# Patient Record
Sex: MALE | Race: OTHER | Employment: UNEMPLOYED | URBAN - METROPOLITAN AREA
[De-identification: names, ages, dates, MRNs, and addresses within clinical notes are randomized per-mention and may not be internally consistent; named-entity substitution may affect disease eponyms.]

---

## 2018-06-06 ENCOUNTER — OFFICE VISIT (OUTPATIENT)
Dept: FAMILY MEDICINE CLINIC | Age: 28
End: 2018-06-06
Payer: MEDICARE

## 2018-06-06 VITALS
WEIGHT: 207 LBS | DIASTOLIC BLOOD PRESSURE: 78 MMHG | HEART RATE: 76 BPM | TEMPERATURE: 98.7 F | BODY MASS INDEX: 28.04 KG/M2 | HEIGHT: 72 IN | SYSTOLIC BLOOD PRESSURE: 120 MMHG

## 2018-06-06 DIAGNOSIS — Z13.220 SCREENING FOR CHOLESTEROL LEVEL: ICD-10-CM

## 2018-06-06 DIAGNOSIS — Z13.228 SCREENING FOR METABOLIC DISORDER: ICD-10-CM

## 2018-06-06 DIAGNOSIS — Z72.89 OTHER PROBLEMS RELATED TO LIFESTYLE: ICD-10-CM

## 2018-06-06 DIAGNOSIS — R42 VERTIGO: Primary | ICD-10-CM

## 2018-06-06 LAB — HIV AG/AB: NEGATIVE

## 2018-06-06 PROCEDURE — G8419 CALC BMI OUT NRM PARAM NOF/U: HCPCS | Performed by: NURSE PRACTITIONER

## 2018-06-06 PROCEDURE — G8427 DOCREV CUR MEDS BY ELIG CLIN: HCPCS | Performed by: NURSE PRACTITIONER

## 2018-06-06 PROCEDURE — 36415 COLL VENOUS BLD VENIPUNCTURE: CPT | Performed by: NURSE PRACTITIONER

## 2018-06-06 PROCEDURE — 99202 OFFICE O/P NEW SF 15 MIN: CPT | Performed by: NURSE PRACTITIONER

## 2018-06-06 PROCEDURE — 1036F TOBACCO NON-USER: CPT | Performed by: NURSE PRACTITIONER

## 2018-06-06 RX ORDER — FLUTICASONE PROPIONATE 50 MCG
1 SPRAY, SUSPENSION (ML) NASAL DAILY
Qty: 1 BOTTLE | Refills: 0 | Status: SHIPPED | OUTPATIENT
Start: 2018-06-06

## 2018-06-06 RX ORDER — MECLIZINE HYDROCHLORIDE 25 MG/1
25 TABLET ORAL 3 TIMES DAILY PRN
Qty: 30 TABLET | Refills: 0 | Status: SHIPPED | OUTPATIENT
Start: 2018-06-06 | End: 2018-06-16

## 2018-06-06 ASSESSMENT — ENCOUNTER SYMPTOMS
NAUSEA: 1
SORE THROAT: 0
VOMITING: 0

## 2018-06-06 ASSESSMENT — PATIENT HEALTH QUESTIONNAIRE - PHQ9
1. LITTLE INTEREST OR PLEASURE IN DOING THINGS: 0
SUM OF ALL RESPONSES TO PHQ QUESTIONS 1-9: 0
SUM OF ALL RESPONSES TO PHQ QUESTIONS 1-9: 0
SUM OF ALL RESPONSES TO PHQ9 QUESTIONS 1 & 2: 0
SUM OF ALL RESPONSES TO PHQ9 QUESTIONS 1 & 2: 0
2. FEELING DOWN, DEPRESSED OR HOPELESS: 0
1. LITTLE INTEREST OR PLEASURE IN DOING THINGS: 0

## 2018-06-07 DIAGNOSIS — Z13.228 SCREENING FOR METABOLIC DISORDER: ICD-10-CM

## 2018-06-07 DIAGNOSIS — Z13.220 SCREENING FOR CHOLESTEROL LEVEL: ICD-10-CM

## 2018-06-07 DIAGNOSIS — R73.01 IMPAIRED FASTING GLUCOSE: Primary | ICD-10-CM

## 2018-06-11 DIAGNOSIS — Z72.89 OTHER PROBLEMS RELATED TO LIFESTYLE: ICD-10-CM

## 2022-05-17 ENCOUNTER — HOSPITAL ENCOUNTER (EMERGENCY)
Age: 32
Discharge: HOME | End: 2022-05-18
Attending: EMERGENCY MEDICINE
Payer: MEDICARE

## 2022-05-17 ENCOUNTER — APPOINTMENT (OUTPATIENT)
Dept: GENERAL RADIOLOGY | Age: 32
End: 2022-05-17
Payer: MEDICARE

## 2022-05-17 VITALS
HEART RATE: 72 BPM | TEMPERATURE: 97.8 F | RESPIRATION RATE: 18 BRPM | BODY MASS INDEX: 31.02 KG/M2 | WEIGHT: 229 LBS | HEIGHT: 72 IN | SYSTOLIC BLOOD PRESSURE: 166 MMHG | OXYGEN SATURATION: 99 % | DIASTOLIC BLOOD PRESSURE: 109 MMHG

## 2022-05-17 DIAGNOSIS — R03.0 ELEVATED BLOOD PRESSURE READING: ICD-10-CM

## 2022-05-17 DIAGNOSIS — S46.911A STRAIN OF RIGHT SHOULDER, INITIAL ENCOUNTER: Primary | ICD-10-CM

## 2022-05-17 PROCEDURE — 96372 THER/PROPH/DIAG INJ SC/IM: CPT

## 2022-05-17 PROCEDURE — 99284 EMERGENCY DEPT VISIT MOD MDM: CPT

## 2022-05-17 PROCEDURE — 73030 X-RAY EXAM OF SHOULDER: CPT

## 2022-05-17 ASSESSMENT — PAIN - FUNCTIONAL ASSESSMENT: PAIN_FUNCTIONAL_ASSESSMENT: 0-10

## 2022-05-17 ASSESSMENT — PAIN SCALES - GENERAL: PAINLEVEL_OUTOF10: 7

## 2022-05-18 PROCEDURE — 6360000002 HC RX W HCPCS: Performed by: PHYSICIAN ASSISTANT

## 2022-05-18 RX ORDER — KETOROLAC TROMETHAMINE 30 MG/ML
60 INJECTION, SOLUTION INTRAMUSCULAR; INTRAVENOUS ONCE
Status: COMPLETED | OUTPATIENT
Start: 2022-05-18 | End: 2022-05-18

## 2022-05-18 RX ORDER — NAPROXEN 500 MG/1
500 TABLET ORAL 2 TIMES DAILY WITH MEALS
Qty: 20 TABLET | Refills: 0 | Status: SHIPPED | OUTPATIENT
Start: 2022-05-18

## 2022-05-18 RX ORDER — METHOCARBAMOL 750 MG/1
750 TABLET, FILM COATED ORAL 3 TIMES DAILY
Qty: 30 TABLET | Refills: 0 | Status: SHIPPED | OUTPATIENT
Start: 2022-05-18 | End: 2022-05-28

## 2022-05-18 RX ADMIN — KETOROLAC TROMETHAMINE 60 MG: 30 INJECTION, SOLUTION INTRAMUSCULAR at 00:20

## 2022-05-18 ASSESSMENT — PAIN SCALES - GENERAL: PAINLEVEL_OUTOF10: 8

## 2022-05-18 NOTE — ED PROVIDER NOTES
[address] [place] [address] ED  [facility]      Pt Name: Tyron Da Silva  MRN: [id number]  [place] 1990  Date ofevaluation: 2022  Provider: Nabor De aL Vega PA-C    CHIEF COMPLAINT       Chief Complaint   Patient presents with    Shoulder Pain     R, x1.5 years, no injury         HISTORY OF PRESENT ILLNESS  (Location/Symptom, Timing/Onset, Context/Setting, Quality, Duration, Modifying Factors, Severity.)   Tyron Da Silva is a 32 y.o. male who presents to the emergency department with let shoulder pain for 1. 5 years. Denies injuries. Pain started 1.5 yearsago when working in  [address] [employer]. Never sought evaluation. Pain intermittent and worse with movement and relieved with rest.   Nursing Notes were reviewed. ALLERGIES     Patient has no known allergies. CURRENT MEDICATIONS       Previous Medications    FLUTICASONE (FLONASE) 50 MCG/ACT NASAL SPRAY    1 spray by Nasal route daily       PAST MEDICAL HISTORY   History reviewed. No pertinent past medical history. SURGICAL HISTORY           Procedure Laterality Date    ADENOIDECTOMY      TYMPANOSTOMY TUBE PLACEMENT           FAMILY HISTORY           Problem Relation Age of Onset    No Known Problems Mother     Diabetes Father      Family Status   Relation Name Status    Mother  Alive    Father          SOCIAL HISTORY      reports that he has never smoked. He has never used smokeless tobacco. He reports current alcohol use. He reports that he does not use drugs. REVIEW OFSYSTEMS    (2-9 systems for level 4, 10 or more for level 5)   Review of Systems    Except as noted above the remainder of the review of systems was reviewed and negative.      PHYSICAL EXAM    (up to 7 for level 4, 8 or more for level 5)     ED Triage Vitals [22 2248]   BP Temp Temp Source Pulse Resp SpO2 Height Weight   (!) 166/109 97.8 °F (36.6 °C) Oral 72 18 99 % 6' (1.829 m) 229 lb (103.9 kg)      Physical Exam  Constitutional:       Appearance: in the emergency department. I recommend that the patient call the primary care provider listed on their discharge instructions or a physician of their choice this week to arrange follow up for further evaluation of possible pre-hypertension or Hypertension. CONSULTS:  None    PROCEDURES:  Procedures        FINAL IMPRESSION      1. Strain of right shoulder, initial encounter    2.  Elevated blood pressure reading          DISPOSITION/PLAN   DISPOSITION Decision To Discharge 05/18/2022 12:00:41 AM      PATIENTREFERRED TO:   Nicolasa Scott MD  [address] [facility] [address]  [address] [facility] [address]  [phone]    In 3 days      JAZMIN/ Yaneth 9  [address] [facility] [address]  [phone]  In 3 days        DISCHARGE MEDICATIONS:     New Prescriptions    METHOCARBAMOL (ROBAXIN-750) 750 MG TABLET    Take 1 tablet by mouth 3 times daily for 10 days    NAPROXEN (NAPROSYN) 500 MG TABLET    Take 1 tablet by mouth 2 times daily (with meals)           (Please note that portions of this note were completed with a voice recognition program.  Efforts were made to edit thedictations but occasionally words are mis-transcribed.)    ARPIT Dubois PA-C  05/18/22 0013